# Patient Record
(demographics unavailable — no encounter records)

---

## 2025-01-23 NOTE — END OF VISIT
[FreeTextEntry3] : A physician assistant/resident assisted with documenting the visit and acted as a scribe. I have seen and examined the patient, made my assessment and plan and have made all modifications necessary to the note.  Guadalupe Akhtar MD Pediatric Orthopaedics Surgery [facility]

## 2025-01-23 NOTE — ASSESSMENT
[FreeTextEntry1] :  11 year old female patient with left foot contusion.   Today's visit included obtaining the history from the child and parent, due to the child's age, the child could not be considered a reliable historian, requiring the parent to act as an independent historian. The condition, natural history, and prognosis were explained to the patient and family. The clinical findings and images were reviewed with the family.  Left Foot XR taken at  on 1/23/25  and were viewed and independently interpreted: Patient is skeletally immature, the epiphyses and physes are intact, there is no fracture, dislocation, or bony abnormalities appreciated, the soft tissues are unremarkable  Clincially she only has TTP over the area of localized swelling and bruising. No fractures seen on XR. We discussed that a contusion usually takes 2 weeks to improve. I recommended that she take Tylenol/Motrin and apply ice for pain control. She can remain out of activities for 1 week.  I am happy to see MARIUSZ if there are any concerns or anytime a problem arises in the future.  This plan was discussed with the family. Family verbalizes understanding and agreement of plan. All questions and concerns were addressed today.  I, NADER CASTILLO, acted as a scribe on behalf of DR. LOZANO on 01/23/2025.

## 2025-01-23 NOTE — DATA REVIEWED
[de-identified] : Left Foot XR taken at  on 1/23/25  and were viewed and independently interpreted: Patient is skeletally immature, the epiphyses and physes are intact, there is no fracture, dislocation, or bony abnormalities appreciated, the soft tissues are unremarkable

## 2025-01-23 NOTE — HISTORY OF PRESENT ILLNESS
[FreeTextEntry1] : 11 year old female patient for orthopaedic evaluation of left foot pain. Mom states that patient started complaining of pain on Sunday or Monday. Pt cannot remember any injury or trauma to the foot. She feels that she likely bumped it into something. Pt is in dance and soccer, however has not done those activities since the pain started.   DOI: 1/20/25

## 2025-01-23 NOTE — REASON FOR VISIT
[Initial Evaluation] : an initial evaluation [Patient] : patient [Mother] : mother [FreeTextEntry1] : left foot contusion

## 2025-01-23 NOTE — PHYSICAL EXAM
[FreeTextEntry1] : Gait: Presents ambulating independently without signs of antalgia.  Good coordination and balance noted. Plantigrade foot with heel-to-toe progression. Neutral foot progression angle. GENERAL: Healthy appearing. Alert, cooperative, in NAD SKIN: The skin is intact, warm, pink and dry over the area examined. EYES: Normal conjunctiva, normal eyelids and pupils were equal and round. ENT: normal ears, normal nose and normal lips. CARDIOVASCULAR: brisk capillary refill, but no peripheral edema. RESPIRATORY: The patient is in no apparent respiratory distress. They're taking full deep breaths without use of accessory muscles or evidence of audible wheezes or stridor without the use of a stethoscope. Normal respiratory effort. ABDOMEN: not examined MUSCULOSKELETAL: LEFT FOOT:  localized swelling and bruising over the dorsum of the medial foot centered over the 1st and 2nd MTP joints; she has mild TTP over the area of bruising, - TTP over the medial aspect of 1st metatarsal, EHL/EDL are intact + FHL/TA, SILT M/L/1st DWS, 2+ DP pulse, WWP distally